# Patient Record
Sex: FEMALE | Race: WHITE | Employment: UNEMPLOYED | URBAN - METROPOLITAN AREA
[De-identification: names, ages, dates, MRNs, and addresses within clinical notes are randomized per-mention and may not be internally consistent; named-entity substitution may affect disease eponyms.]

---

## 2020-01-07 ENCOUNTER — TELEPHONE (OUTPATIENT)
Dept: OBGYN | Age: 20
End: 2020-01-07

## 2020-02-06 ENCOUNTER — INITIAL PRENATAL (OUTPATIENT)
Dept: OBGYN | Age: 20
End: 2020-02-06
Payer: MEDICAID

## 2020-02-06 ENCOUNTER — HOSPITAL ENCOUNTER (OUTPATIENT)
Age: 20
Discharge: HOME | End: 2020-02-06
Payer: MEDICAID

## 2020-02-06 VITALS — WEIGHT: 188 LBS | SYSTOLIC BLOOD PRESSURE: 126 MMHG | HEART RATE: 90 BPM | DIASTOLIC BLOOD PRESSURE: 61 MMHG

## 2020-02-06 LAB
ABO/RH: NORMAL
ANTIBODY SCREEN: NORMAL
BASOPHILS ABSOLUTE: 0.1 K/UL (ref 0–0.2)
BASOPHILS RELATIVE PERCENT: 0.7 %
BILIRUBIN URINE: NEGATIVE MG/DL
BLOOD, URINE: ABNORMAL
CLARITY: CLEAR
COLOR: YELLOW
EOSINOPHILS ABSOLUTE: 0.1 K/UL (ref 0–0.6)
EOSINOPHILS RELATIVE PERCENT: 1.4 %
GLUCOSE URINE: NEGATIVE MG/DL
HCT VFR BLD CALC: 34.5 % (ref 36–48)
HEMOGLOBIN: 12 G/DL (ref 12–16)
HEPATITIS B SURFACE ANTIGEN INTERPRETATION: ABNORMAL
HEPATITIS C ANTIBODY INTERPRETATION: NORMAL
HIV AG/AB: NORMAL
HIV ANTIGEN: NORMAL
HIV-1 ANTIBODY: NORMAL
HIV-2 AB: NORMAL
KETONES, URINE: NEGATIVE MG/DL
LEUKOCYTE ESTERASE, URINE: ABNORMAL
LYMPHOCYTES ABSOLUTE: 1.4 K/UL (ref 1–5.1)
LYMPHOCYTES RELATIVE PERCENT: 14.3 %
MCH RBC QN AUTO: 31.4 PG (ref 26–34)
MCHC RBC AUTO-ENTMCNC: 34.6 G/DL (ref 31–36)
MCV RBC AUTO: 90.7 FL (ref 80–100)
MONOCYTES ABSOLUTE: 0.5 K/UL (ref 0–1.3)
MONOCYTES RELATIVE PERCENT: 4.8 %
NEUTROPHILS ABSOLUTE: 7.5 K/UL (ref 1.7–7.7)
NEUTROPHILS RELATIVE PERCENT: 78.8 %
NITRITE, URINE: NEGATIVE
PDW BLD-RTO: 14 % (ref 12.4–15.4)
PH UA: 7 (ref 5–8)
PLATELET # BLD: 224 K/UL (ref 135–450)
PMV BLD AUTO: 8.8 FL (ref 5–10.5)
PROTEIN UA: ABNORMAL MG/DL
RBC # BLD: 3.81 M/UL (ref 4–5.2)
RUBELLA ANTIBODY IGG: 88 IU/ML
SPECIFIC GRAVITY UA: 1.02 (ref 1–1.03)
TOTAL SYPHILLIS IGG/IGM: ABNORMAL
UROBILINOGEN, URINE: 0.2 E.U./DL
WBC # BLD: 9.5 K/UL (ref 4–11)

## 2020-02-06 PROCEDURE — 99203 OFFICE O/P NEW LOW 30 MIN: CPT | Performed by: OBSTETRICS & GYNECOLOGY

## 2020-02-06 PROCEDURE — 87340 HEPATITIS B SURFACE AG IA: CPT

## 2020-02-06 PROCEDURE — 6360000002 HC RX W HCPCS: Performed by: OBSTETRICS & GYNECOLOGY

## 2020-02-06 PROCEDURE — 87491 CHLMYD TRACH DNA AMP PROBE: CPT

## 2020-02-06 PROCEDURE — 87086 URINE CULTURE/COLONY COUNT: CPT

## 2020-02-06 PROCEDURE — 86762 RUBELLA ANTIBODY: CPT

## 2020-02-06 PROCEDURE — 86803 HEPATITIS C AB TEST: CPT

## 2020-02-06 PROCEDURE — 86850 RBC ANTIBODY SCREEN: CPT

## 2020-02-06 PROCEDURE — 86901 BLOOD TYPING SEROLOGIC RH(D): CPT

## 2020-02-06 PROCEDURE — 86780 TREPONEMA PALLIDUM: CPT

## 2020-02-06 PROCEDURE — 81003 URINALYSIS AUTO W/O SCOPE: CPT

## 2020-02-06 PROCEDURE — 36415 COLL VENOUS BLD VENIPUNCTURE: CPT

## 2020-02-06 PROCEDURE — 85025 COMPLETE CBC W/AUTO DIFF WBC: CPT

## 2020-02-06 PROCEDURE — 86900 BLOOD TYPING SEROLOGIC ABO: CPT

## 2020-02-06 PROCEDURE — G0008 ADMIN INFLUENZA VIRUS VAC: HCPCS | Performed by: OBSTETRICS & GYNECOLOGY

## 2020-02-06 PROCEDURE — 86702 HIV-2 ANTIBODY: CPT

## 2020-02-06 PROCEDURE — 90686 IIV4 VACC NO PRSV 0.5 ML IM: CPT | Performed by: OBSTETRICS & GYNECOLOGY

## 2020-02-06 PROCEDURE — 87390 HIV-1 AG IA: CPT

## 2020-02-06 PROCEDURE — 86701 HIV-1ANTIBODY: CPT

## 2020-02-06 PROCEDURE — 87591 N.GONORRHOEAE DNA AMP PROB: CPT

## 2020-02-06 RX ORDER — VITAMIN A, VITAMIN C, VITAMIN D-3, VITAMIN E, VITAMIN B-1, VITAMIN B-2, NIACIN, VITAMIN B-6, CALCIUM, IRON, ZINC, COPPER 4000; 120; 400; 22; 1.84; 3; 20; 10; 1; 12; 200; 27; 25; 2 [IU]/1; MG/1; [IU]/1; MG/1; MG/1; MG/1; MG/1; MG/1; MG/1; UG/1; MG/1; MG/1; MG/1; MG/1
1 TABLET ORAL DAILY
Qty: 30 TABLET | Refills: 11 | Status: ON HOLD | OUTPATIENT
Start: 2020-02-06 | End: 2020-06-14 | Stop reason: HOSPADM

## 2020-02-06 RX ADMIN — INFLUENZA A VIRUS A/BRISBANE/02/2018 IVR-190 (H1N1) ANTIGEN (PROPIOLACTONE INACTIVATED), INFLUENZA A VIRUS A/KANSAS/14/2017 X-327 (H3N2) ANTIGEN (PROPIOLACTONE INACTIVATED), INFLUENZA B VIRUS B/MARYLAND/15/2016 ANTIGEN (PROPIOLACTONE INACTIVATED), INFLUENZA B VIRUS B/PHUKET/3073/2013 BVR-1B ANTIGEN (PROPIOLACTONE INACTIVATED) 0.5 ML: 15; 15; 15; 15 INJECTION, SUSPENSION INTRAMUSCULAR at 08:52
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
## 2020-02-06 NOTE — PROGRESS NOTES
Influenza vaccine given IM left arm per patient request after vaccine info given to patient and consent signed.

## 2020-02-06 NOTE — PROGRESS NOTES
Initial OB visit  S: no c/o  O: see PE  A/P: 21 yo  at 21w6d  1. +IUP: PE/DNAP done  2. Genetic testing- late  3.  Late prenatal care

## 2020-02-07 LAB
C TRACH DNA GENITAL QL NAA+PROBE: NEGATIVE
N. GONORRHOEAE DNA: NEGATIVE
URINE CULTURE, ROUTINE: NORMAL

## 2020-02-08 ENCOUNTER — HOSPITAL ENCOUNTER (OUTPATIENT)
Dept: ULTRASOUND IMAGING | Age: 20
Discharge: HOME | End: 2020-02-08
Payer: MEDICAID

## 2020-02-08 PROCEDURE — 76805 OB US >/= 14 WKS SNGL FETUS: CPT

## 2020-02-27 ENCOUNTER — HOSPITAL ENCOUNTER (OUTPATIENT)
Age: 20
Discharge: HOME | End: 2020-02-27
Payer: MEDICAID

## 2020-02-27 ENCOUNTER — ROUTINE PRENATAL (OUTPATIENT)
Dept: OBGYN | Age: 20
End: 2020-02-27
Payer: MEDICAID

## 2020-02-27 VITALS — SYSTOLIC BLOOD PRESSURE: 117 MMHG | WEIGHT: 189 LBS | DIASTOLIC BLOOD PRESSURE: 65 MMHG | HEART RATE: 86 BPM

## 2020-02-27 LAB
BILIRUBIN URINE: NEGATIVE MG/DL
BLOOD, URINE: ABNORMAL
CLARITY: CLEAR
COLOR: YELLOW
GLUCOSE CHALLENGE: 119 MG/DL
GLUCOSE URINE: NEGATIVE MG/DL
HCT VFR BLD CALC: 33.1 % (ref 36–48)
HEMOGLOBIN: 11.1 G/DL (ref 12–16)
KETONES, URINE: NEGATIVE MG/DL
LEUKOCYTE ESTERASE, URINE: ABNORMAL
MCH RBC QN AUTO: 30.6 PG (ref 26–34)
MCHC RBC AUTO-ENTMCNC: 33.5 G/DL (ref 31–36)
MCV RBC AUTO: 91.4 FL (ref 80–100)
NITRITE, URINE: NEGATIVE
PDW BLD-RTO: 13.6 % (ref 12.4–15.4)
PH UA: 7 (ref 5–8)
PLATELET # BLD: 221 K/UL (ref 135–450)
PMV BLD AUTO: 8.7 FL (ref 5–10.5)
PROTEIN UA: NEGATIVE MG/DL
RBC # BLD: 3.62 M/UL (ref 4–5.2)
SPECIFIC GRAVITY UA: >=1.03 (ref 1–1.03)
UROBILINOGEN, URINE: 0.2 E.U./DL
WBC # BLD: 9.5 K/UL (ref 4–11)

## 2020-02-27 PROCEDURE — 82950 GLUCOSE TEST: CPT

## 2020-02-27 PROCEDURE — 85027 COMPLETE CBC AUTOMATED: CPT

## 2020-02-27 PROCEDURE — 81003 URINALYSIS AUTO W/O SCOPE: CPT

## 2020-02-27 PROCEDURE — 36415 COLL VENOUS BLD VENIPUNCTURE: CPT

## 2020-02-27 NOTE — PROGRESS NOTES
Social Service Note:   Patient completed depression scale. Patient scored a 6 and is not showing signs of depression. Patient didn't have any other questions or needs at this time.     Sherley Zhang BSW, [place]

## 2020-03-27 ENCOUNTER — ROUTINE PRENATAL (OUTPATIENT)
Dept: OBGYN | Age: 20
End: 2020-03-27
Payer: MEDICAID

## 2020-03-27 VITALS — DIASTOLIC BLOOD PRESSURE: 62 MMHG | HEART RATE: 87 BPM | WEIGHT: 196 LBS | SYSTOLIC BLOOD PRESSURE: 111 MMHG

## 2020-03-27 PROCEDURE — 90715 TDAP VACCINE 7 YRS/> IM: CPT | Performed by: OBSTETRICS & GYNECOLOGY

## 2020-03-27 PROCEDURE — 90471 IMMUNIZATION ADMIN: CPT | Performed by: OBSTETRICS & GYNECOLOGY

## 2020-03-27 PROCEDURE — 99212 OFFICE O/P EST SF 10 MIN: CPT | Performed by: OBSTETRICS & GYNECOLOGY

## 2020-03-27 PROCEDURE — 81003 URINALYSIS AUTO W/O SCOPE: CPT

## 2020-03-27 PROCEDURE — 6360000002 HC RX W HCPCS: Performed by: OBSTETRICS & GYNECOLOGY

## 2020-03-27 RX ADMIN — TETANUS TOXOID, REDUCED DIPHTHERIA TOXOID AND ACELLULAR PERTUSSIS VACCINE, ADSORBED 0.5 ML: 5; 2.5; 8; 8; 2.5 SUSPENSION INTRAMUSCULAR at 15:02

## 2020-03-27 NOTE — PROGRESS NOTES
Pt with c/o today of HA for the last 3 days unrelieved by Tylenol; states baby is active. Patient denies vaginal bleeding and leaking of fluid/ OCCASIONAL ctx. Tdap vaccine information sheet given and explained; pt states understanding; consent form signed and vaccine given.

## 2020-03-30 LAB
BILIRUBIN URINE: NEGATIVE MG/DL
BLOOD, URINE: ABNORMAL
CLARITY: CLEAR
COLOR: YELLOW
GLUCOSE URINE: NEGATIVE MG/DL
KETONES, URINE: NEGATIVE MG/DL
LEUKOCYTE ESTERASE, URINE: ABNORMAL
NITRITE, URINE: NEGATIVE
PH UA: 7 (ref 5–8)
PROTEIN UA: 30 MG/DL
SPECIFIC GRAVITY UA: 1.02 (ref 1–1.03)
UROBILINOGEN, URINE: 1 E.U./DL

## 2020-05-01 ENCOUNTER — ROUTINE PRENATAL (OUTPATIENT)
Dept: OBGYN | Age: 20
End: 2020-05-01
Payer: MEDICAID

## 2020-05-01 VITALS — SYSTOLIC BLOOD PRESSURE: 126 MMHG | DIASTOLIC BLOOD PRESSURE: 75 MMHG | WEIGHT: 208 LBS | HEART RATE: 100 BPM

## 2020-05-01 PROCEDURE — 81003 URINALYSIS AUTO W/O SCOPE: CPT

## 2020-05-01 PROCEDURE — 99212 OFFICE O/P EST SF 10 MIN: CPT | Performed by: OBSTETRICS & GYNECOLOGY

## 2020-05-04 ENCOUNTER — HOSPITAL ENCOUNTER (OUTPATIENT)
Dept: ULTRASOUND IMAGING | Age: 20
Discharge: HOME | End: 2020-05-04
Payer: MEDICAID

## 2020-05-04 LAB
BILIRUBIN URINE: NEGATIVE MG/DL
BLOOD, URINE: ABNORMAL
CLARITY: CLEAR
COLOR: YELLOW
GLUCOSE URINE: NEGATIVE MG/DL
KETONES, URINE: NEGATIVE MG/DL
LEUKOCYTE ESTERASE, URINE: ABNORMAL
NITRITE, URINE: NEGATIVE
PH UA: 7 (ref 5–8)
PROTEIN UA: 30 MG/DL
SPECIFIC GRAVITY UA: 1.02 (ref 1–1.03)
UROBILINOGEN, URINE: 0.2 E.U./DL

## 2020-05-04 PROCEDURE — 76805 OB US >/= 14 WKS SNGL FETUS: CPT

## 2020-05-07 ENCOUNTER — HOSPITAL ENCOUNTER (OUTPATIENT)
Age: 20
Discharge: HOME | End: 2020-05-07
Attending: OBSTETRICS & GYNECOLOGY | Admitting: OBSTETRICS & GYNECOLOGY
Payer: MEDICAID

## 2020-05-07 ENCOUNTER — TELEPHONE (OUTPATIENT)
Dept: OBGYN | Age: 20
End: 2020-05-07

## 2020-05-07 VITALS
DIASTOLIC BLOOD PRESSURE: 52 MMHG | HEART RATE: 82 BPM | HEIGHT: 63 IN | RESPIRATION RATE: 20 BRPM | SYSTOLIC BLOOD PRESSURE: 107 MMHG | BODY MASS INDEX: 36.85 KG/M2 | TEMPERATURE: 99.1 F

## 2020-05-07 PROBLEM — O09.213 RISK OF PRETERM LABOR IN THIRD TRIMESTER: Status: ACTIVE | Noted: 2020-05-07

## 2020-05-07 LAB
BACTERIA: ABNORMAL /HPF
BILIRUBIN URINE: NEGATIVE
BLOOD, URINE: ABNORMAL
CLARITY: CLEAR
COLOR: YELLOW
EPITHELIAL CELLS, UA: 3 /HPF (ref 0–5)
GLUCOSE URINE: NEGATIVE MG/DL
HYALINE CASTS: 4 /LPF (ref 0–8)
KETONES, URINE: NEGATIVE MG/DL
LEUKOCYTE ESTERASE, URINE: ABNORMAL
MICROSCOPIC EXAMINATION: YES
NITRITE, URINE: NEGATIVE
PH UA: 6 (ref 5–8)
PROTEIN UA: NEGATIVE MG/DL
RBC UA: 6 /HPF (ref 0–4)
SPECIFIC GRAVITY UA: 1.02 (ref 1–1.03)
URINE TYPE: ABNORMAL
UROBILINOGEN, URINE: 0.2 E.U./DL
WBC UA: 22 /HPF (ref 0–5)

## 2020-05-07 PROCEDURE — 81001 URINALYSIS AUTO W/SCOPE: CPT

## 2020-05-07 PROCEDURE — 87086 URINE CULTURE/COLONY COUNT: CPT

## 2020-05-07 PROCEDURE — 59025 FETAL NON-STRESS TEST: CPT

## 2020-05-07 NOTE — TELEPHONE ENCOUNTER
Patient calls complaining of a large amount of clearish discharge that runs down her leg when standing. Patient states this has happened more than once today. Patient reports active fetal movement & denies any regular ctxs or vaginal bleeding. Patient advised to go to [facility] triage for rule out ROM. [facility] OB triage notified of patient future arrival today.

## 2020-05-08 LAB — URINE CULTURE, ROUTINE: NORMAL

## 2020-05-09 NOTE — PROCEDURES
FETAL SURVEILLANCE TESTING SUMMARY    INDICATIONS:  rule out uterine contractions    OBJECTIVE RESULTS:  Fetal heart variability: moderate  Fetal Heart Rate decelerations: none  Fetal Heart Rate accelerations: yes  Baseline FHR: 140 per minute  Fetal Non-stress Test: reactive    Fetal surveillance: reassuring

## 2020-05-15 ENCOUNTER — ROUTINE PRENATAL (OUTPATIENT)
Dept: OBGYN | Age: 20
End: 2020-05-15
Payer: MEDICAID

## 2020-05-15 VITALS
HEART RATE: 79 BPM | BODY MASS INDEX: 37.2 KG/M2 | SYSTOLIC BLOOD PRESSURE: 110 MMHG | DIASTOLIC BLOOD PRESSURE: 72 MMHG | WEIGHT: 210 LBS

## 2020-05-15 PROCEDURE — 81003 URINALYSIS AUTO W/O SCOPE: CPT

## 2020-05-15 PROCEDURE — 99213 OFFICE O/P EST LOW 20 MIN: CPT | Performed by: OBSTETRICS & GYNECOLOGY

## 2020-05-15 PROCEDURE — 87081 CULTURE SCREEN ONLY: CPT

## 2020-05-15 NOTE — PROGRESS NOTES
Routine visit no complaints offered states she was having a watery discharge week ago went to triage. states it has decreased since then.  Occasional contractions no bleeding. + fetal movement meeting kick counts GBS today

## 2020-05-18 LAB
BILIRUBIN URINE: NEGATIVE MG/DL
BLOOD, URINE: ABNORMAL
CLARITY: CLEAR
COLOR: YELLOW
GLUCOSE URINE: NEGATIVE MG/DL
GROUP B STREP CULTURE: NORMAL
KETONES, URINE: NEGATIVE MG/DL
LEUKOCYTE ESTERASE, URINE: ABNORMAL
NITRITE, URINE: NEGATIVE
PH UA: 6.5 (ref 5–8)
PROTEIN UA: NEGATIVE MG/DL
SPECIFIC GRAVITY UA: 1.02 (ref 1–1.03)
UROBILINOGEN, URINE: 0.2 E.U./DL

## 2020-05-22 ENCOUNTER — ROUTINE PRENATAL (OUTPATIENT)
Dept: OBGYN | Age: 20
End: 2020-05-22
Payer: MEDICAID

## 2020-05-22 VITALS
HEART RATE: 82 BPM | SYSTOLIC BLOOD PRESSURE: 125 MMHG | WEIGHT: 211 LBS | BODY MASS INDEX: 37.38 KG/M2 | DIASTOLIC BLOOD PRESSURE: 62 MMHG

## 2020-05-22 LAB
BILIRUBIN URINE: NEGATIVE MG/DL
BLOOD, URINE: NEGATIVE
CLARITY: CLEAR
COLOR: YELLOW
GLUCOSE URINE: NEGATIVE MG/DL
KETONES, URINE: NEGATIVE MG/DL
LEUKOCYTE ESTERASE, URINE: ABNORMAL
NITRITE, URINE: NEGATIVE
PH UA: 7 (ref 5–8)
PROTEIN UA: NEGATIVE MG/DL
SPECIFIC GRAVITY UA: 1.02 (ref 1–1.03)
UROBILINOGEN, URINE: 0.2 E.U./DL

## 2020-05-22 PROCEDURE — 99212 OFFICE O/P EST SF 10 MIN: CPT | Performed by: OBSTETRICS & GYNECOLOGY

## 2020-05-22 PROCEDURE — 81003 URINALYSIS AUTO W/O SCOPE: CPT

## 2020-05-29 ENCOUNTER — OFFICE VISIT (OUTPATIENT)
Dept: PRIMARY CARE CLINIC | Age: 20
End: 2020-05-29

## 2020-05-29 ENCOUNTER — ROUTINE PRENATAL (OUTPATIENT)
Dept: OBGYN | Age: 20
End: 2020-05-29
Payer: MEDICAID

## 2020-05-29 VITALS
SYSTOLIC BLOOD PRESSURE: 110 MMHG | HEART RATE: 84 BPM | DIASTOLIC BLOOD PRESSURE: 64 MMHG | BODY MASS INDEX: 38.09 KG/M2 | WEIGHT: 215 LBS

## 2020-05-29 LAB
BILIRUBIN URINE: NEGATIVE MG/DL
BLOOD, URINE: ABNORMAL
CLARITY: CLEAR
COLOR: YELLOW
GLUCOSE URINE: NEGATIVE MG/DL
KETONES, URINE: NEGATIVE MG/DL
LEUKOCYTE ESTERASE, URINE: ABNORMAL
NITRITE, URINE: NEGATIVE
PH UA: 7 (ref 5–8)
PROTEIN UA: NEGATIVE MG/DL
SARS-COV-2, NAAT: NOT DETECTED
SPECIFIC GRAVITY UA: >=1.03 (ref 1–1.03)
UROBILINOGEN, URINE: 0.2 E.U./DL

## 2020-05-29 PROCEDURE — 99212 OFFICE O/P EST SF 10 MIN: CPT | Performed by: OBSTETRICS & GYNECOLOGY

## 2020-05-29 PROCEDURE — 81003 URINALYSIS AUTO W/O SCOPE: CPT

## 2020-06-05 ENCOUNTER — ROUTINE PRENATAL (OUTPATIENT)
Dept: OBGYN | Age: 20
End: 2020-06-05
Payer: MEDICAID

## 2020-06-05 VITALS — BODY MASS INDEX: 38.37 KG/M2 | DIASTOLIC BLOOD PRESSURE: 71 MMHG | WEIGHT: 216.6 LBS | SYSTOLIC BLOOD PRESSURE: 115 MMHG

## 2020-06-05 LAB
BILIRUBIN URINE: NEGATIVE MG/DL
BLOOD, URINE: ABNORMAL
CLARITY: CLEAR
COLOR: YELLOW
GLUCOSE URINE: NEGATIVE MG/DL
KETONES, URINE: NEGATIVE MG/DL
LEUKOCYTE ESTERASE, URINE: ABNORMAL
NITRITE, URINE: NEGATIVE
PH UA: 7.5 (ref 5–8)
PROTEIN UA: NEGATIVE MG/DL
SPECIFIC GRAVITY UA: >=1.03 (ref 1–1.03)
UROBILINOGEN, URINE: 0.2 E.U./DL

## 2020-06-05 PROCEDURE — 81003 URINALYSIS AUTO W/O SCOPE: CPT

## 2020-06-05 PROCEDURE — 99212 OFFICE O/P EST SF 10 MIN: CPT | Performed by: OBSTETRICS & GYNECOLOGY

## 2020-06-12 ENCOUNTER — ROUTINE PRENATAL (OUTPATIENT)
Dept: OBGYN | Age: 20
DRG: 560 | End: 2020-06-12
Payer: MEDICAID

## 2020-06-12 VITALS
SYSTOLIC BLOOD PRESSURE: 133 MMHG | BODY MASS INDEX: 38.26 KG/M2 | HEART RATE: 88 BPM | DIASTOLIC BLOOD PRESSURE: 72 MMHG | WEIGHT: 216 LBS

## 2020-06-12 LAB
BILIRUBIN URINE: NEGATIVE MG/DL
BLOOD, URINE: ABNORMAL
CLARITY: CLEAR
COLOR: YELLOW
GLUCOSE URINE: NEGATIVE MG/DL
KETONES, URINE: NEGATIVE MG/DL
LEUKOCYTE ESTERASE, URINE: ABNORMAL
NITRITE, URINE: NEGATIVE
PH UA: 6 (ref 5–8)
PROTEIN UA: NEGATIVE MG/DL
SPECIFIC GRAVITY UA: 1.02 (ref 1–1.03)
UROBILINOGEN, URINE: 0.2 E.U./DL

## 2020-06-12 PROCEDURE — 81003 URINALYSIS AUTO W/O SCOPE: CPT

## 2020-06-12 PROCEDURE — 99212 OFFICE O/P EST SF 10 MIN: CPT | Performed by: OBSTETRICS & GYNECOLOGY

## 2020-06-13 ENCOUNTER — ANESTHESIA (OUTPATIENT)
Dept: LABOR AND DELIVERY | Age: 20
DRG: 560 | End: 2020-06-13
Payer: MEDICAID

## 2020-06-13 ENCOUNTER — ANESTHESIA EVENT (OUTPATIENT)
Dept: LABOR AND DELIVERY | Age: 20
DRG: 560 | End: 2020-06-13
Payer: MEDICAID

## 2020-06-13 ENCOUNTER — HOSPITAL ENCOUNTER (INPATIENT)
Age: 20
LOS: 1 days | Discharge: HOME | DRG: 560 | End: 2020-06-15
Attending: OBSTETRICS & GYNECOLOGY | Admitting: OBSTETRICS & GYNECOLOGY
Payer: MEDICAID

## 2020-06-13 LAB
ABO/RH: NORMAL
AMPHETAMINE SCREEN, URINE: NORMAL
ANTIBODY SCREEN: NORMAL
BARBITURATE SCREEN URINE: NORMAL
BASOPHILS ABSOLUTE: 0 K/UL (ref 0–0.2)
BASOPHILS RELATIVE PERCENT: 0.2 %
BENZODIAZEPINE SCREEN, URINE: NORMAL
BUPRENORPHINE URINE: NORMAL
CANNABINOID SCREEN URINE: NORMAL
COCAINE METABOLITE SCREEN URINE: NORMAL
EOSINOPHILS ABSOLUTE: 0.1 K/UL (ref 0–0.6)
EOSINOPHILS RELATIVE PERCENT: 0.6 %
HCT VFR BLD CALC: 33 % (ref 36–48)
HEMOGLOBIN: 11.1 G/DL (ref 12–16)
LYMPHOCYTES ABSOLUTE: 1.2 K/UL (ref 1–5.1)
LYMPHOCYTES RELATIVE PERCENT: 10.3 %
Lab: NORMAL
MCH RBC QN AUTO: 29.4 PG (ref 26–34)
MCHC RBC AUTO-ENTMCNC: 33.5 G/DL (ref 31–36)
MCV RBC AUTO: 87.7 FL (ref 80–100)
METHADONE SCREEN, URINE: NORMAL
MONOCYTES ABSOLUTE: 0.8 K/UL (ref 0–1.3)
MONOCYTES RELATIVE PERCENT: 7.2 %
NEUTROPHILS ABSOLUTE: 9.4 K/UL (ref 1.7–7.7)
NEUTROPHILS RELATIVE PERCENT: 81.7 %
OPIATE SCREEN URINE: NORMAL
OXYCODONE URINE: NORMAL
PDW BLD-RTO: 12.8 % (ref 12.4–15.4)
PH UA: 5
PHENCYCLIDINE SCREEN URINE: NORMAL
PLATELET # BLD: 270 K/UL (ref 135–450)
PMV BLD AUTO: 8.2 FL (ref 5–10.5)
PROPOXYPHENE SCREEN: NORMAL
RBC # BLD: 3.76 M/UL (ref 4–5.2)
WBC # BLD: 11.5 K/UL (ref 4–11)

## 2020-06-13 PROCEDURE — 51702 INSERT TEMP BLADDER CATH: CPT

## 2020-06-13 PROCEDURE — 86850 RBC ANTIBODY SCREEN: CPT

## 2020-06-13 PROCEDURE — 86901 BLOOD TYPING SEROLOGIC RH(D): CPT

## 2020-06-13 PROCEDURE — 3700000025 EPIDURAL BLOCK: Performed by: ANESTHESIOLOGY

## 2020-06-13 PROCEDURE — 6360000002 HC RX W HCPCS: Performed by: OBSTETRICS & GYNECOLOGY

## 2020-06-13 PROCEDURE — 2580000003 HC RX 258: Performed by: OBSTETRICS & GYNECOLOGY

## 2020-06-13 PROCEDURE — 80307 DRUG TEST PRSMV CHEM ANLYZR: CPT

## 2020-06-13 PROCEDURE — 86780 TREPONEMA PALLIDUM: CPT

## 2020-06-13 PROCEDURE — 2500000003 HC RX 250 WO HCPCS: Performed by: NURSE ANESTHETIST, CERTIFIED REGISTERED

## 2020-06-13 PROCEDURE — 6370000000 HC RX 637 (ALT 250 FOR IP): Performed by: OBSTETRICS & GYNECOLOGY

## 2020-06-13 PROCEDURE — 86900 BLOOD TYPING SEROLOGIC ABO: CPT

## 2020-06-13 PROCEDURE — 85025 COMPLETE CBC W/AUTO DIFF WBC: CPT

## 2020-06-13 RX ORDER — SODIUM CHLORIDE 0.9 % (FLUSH) 0.9 %
10 SYRINGE (ML) INJECTION PRN
Status: DISCONTINUED | OUTPATIENT
Start: 2020-06-13 | End: 2020-06-14

## 2020-06-13 RX ORDER — ONDANSETRON 2 MG/ML
4 INJECTION INTRAMUSCULAR; INTRAVENOUS EVERY 6 HOURS PRN
Status: DISCONTINUED | OUTPATIENT
Start: 2020-06-13 | End: 2020-06-14

## 2020-06-13 RX ORDER — SODIUM CHLORIDE, SODIUM LACTATE, POTASSIUM CHLORIDE, CALCIUM CHLORIDE 600; 310; 30; 20 MG/100ML; MG/100ML; MG/100ML; MG/100ML
INJECTION, SOLUTION INTRAVENOUS CONTINUOUS
Status: DISCONTINUED | OUTPATIENT
Start: 2020-06-13 | End: 2020-06-14

## 2020-06-13 RX ORDER — LIDOCAINE HYDROCHLORIDE 10 MG/ML
INJECTION, SOLUTION EPIDURAL; INFILTRATION; INTRACAUDAL; PERINEURAL PRN
Status: DISCONTINUED | OUTPATIENT
Start: 2020-06-13 | End: 2020-06-14 | Stop reason: SDUPTHER

## 2020-06-13 RX ORDER — ACETAMINOPHEN 325 MG/1
650 TABLET ORAL EVERY 4 HOURS PRN
Status: DISCONTINUED | OUTPATIENT
Start: 2020-06-13 | End: 2020-06-14

## 2020-06-13 RX ORDER — LIDOCAINE HYDROCHLORIDE AND EPINEPHRINE 20; 5 MG/ML; UG/ML
INJECTION, SOLUTION EPIDURAL; INFILTRATION; INTRACAUDAL; PERINEURAL PRN
Status: DISCONTINUED | OUTPATIENT
Start: 2020-06-13 | End: 2020-06-14 | Stop reason: SDUPTHER

## 2020-06-13 RX ADMIN — SODIUM CHLORIDE, POTASSIUM CHLORIDE, SODIUM LACTATE AND CALCIUM CHLORIDE: 600; 310; 30; 20 INJECTION, SOLUTION INTRAVENOUS at 21:10

## 2020-06-13 RX ADMIN — Medication 1 MILLI-UNITS/MIN: at 23:35

## 2020-06-13 RX ADMIN — Medication 12 ML/HR: at 21:38

## 2020-06-13 RX ADMIN — Medication 25 MCG: at 16:15

## 2020-06-13 RX ADMIN — LIDOCAINE HYDROCHLORIDE 3 ML: 10 INJECTION, SOLUTION EPIDURAL; INFILTRATION; INTRACAUDAL; PERINEURAL at 21:38

## 2020-06-13 RX ADMIN — Medication 25 MCG: at 12:15

## 2020-06-13 RX ADMIN — SODIUM CHLORIDE, POTASSIUM CHLORIDE, SODIUM LACTATE AND CALCIUM CHLORIDE: 600; 310; 30; 20 INJECTION, SOLUTION INTRAVENOUS at 12:00

## 2020-06-13 RX ADMIN — LIDOCAINE HYDROCHLORIDE AND EPINEPHRINE 3 ML: 20; 5 INJECTION, SOLUTION EPIDURAL; INFILTRATION; INTRACAUDAL; PERINEURAL at 21:38

## 2020-06-13 RX ADMIN — SODIUM CHLORIDE, POTASSIUM CHLORIDE, SODIUM LACTATE AND CALCIUM CHLORIDE: 600; 310; 30; 20 INJECTION, SOLUTION INTRAVENOUS at 17:47

## 2020-06-13 ASSESSMENT — PAIN DESCRIPTION - DESCRIPTORS
DESCRIPTORS: CRAMPING

## 2020-06-13 NOTE — PLAN OF CARE
Problem: Anxiety:  Goal: Level of anxiety will decrease  Description: Level of anxiety will decrease  Outcome: Ongoing
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
## 2020-06-14 LAB — TOTAL SYPHILLIS IGG/IGM: NORMAL

## 2020-06-14 PROCEDURE — 7200000001 HC VAGINAL DELIVERY

## 2020-06-14 PROCEDURE — 2580000003 HC RX 258: Performed by: OBSTETRICS & GYNECOLOGY

## 2020-06-14 PROCEDURE — 3E0P7VZ INTRODUCTION OF HORMONE INTO FEMALE REPRODUCTIVE, VIA NATURAL OR ARTIFICIAL OPENING: ICD-10-PCS | Performed by: OBSTETRICS & GYNECOLOGY

## 2020-06-14 PROCEDURE — 6370000000 HC RX 637 (ALT 250 FOR IP): Performed by: OBSTETRICS & GYNECOLOGY

## 2020-06-14 PROCEDURE — 10907ZC DRAINAGE OF AMNIOTIC FLUID, THERAPEUTIC FROM PRODUCTS OF CONCEPTION, VIA NATURAL OR ARTIFICIAL OPENING: ICD-10-PCS | Performed by: OBSTETRICS & GYNECOLOGY

## 2020-06-14 PROCEDURE — 1220000000 HC SEMI PRIVATE OB R&B

## 2020-06-14 PROCEDURE — 3E033VJ INTRODUCTION OF OTHER HORMONE INTO PERIPHERAL VEIN, PERCUTANEOUS APPROACH: ICD-10-PCS | Performed by: OBSTETRICS & GYNECOLOGY

## 2020-06-14 RX ORDER — SODIUM CHLORIDE 0.9 % (FLUSH) 0.9 %
10 SYRINGE (ML) INJECTION PRN
Status: DISCONTINUED | OUTPATIENT
Start: 2020-06-14 | End: 2020-06-15 | Stop reason: HOSPADM

## 2020-06-14 RX ORDER — SENNA AND DOCUSATE SODIUM 50; 8.6 MG/1; MG/1
1 TABLET, FILM COATED ORAL DAILY PRN
Status: DISCONTINUED | OUTPATIENT
Start: 2020-06-14 | End: 2020-06-15 | Stop reason: HOSPADM

## 2020-06-14 RX ORDER — MODIFIED LANOLIN
OINTMENT (GRAM) TOPICAL PRN
Status: DISCONTINUED | OUTPATIENT
Start: 2020-06-14 | End: 2020-06-15 | Stop reason: HOSPADM

## 2020-06-14 RX ORDER — SIMETHICONE 80 MG
80 TABLET,CHEWABLE ORAL EVERY 6 HOURS PRN
Status: DISCONTINUED | OUTPATIENT
Start: 2020-06-14 | End: 2020-06-15 | Stop reason: HOSPADM

## 2020-06-14 RX ORDER — ONDANSETRON 4 MG/1
4 TABLET, ORALLY DISINTEGRATING ORAL EVERY 6 HOURS PRN
Status: DISCONTINUED | OUTPATIENT
Start: 2020-06-14 | End: 2020-06-15 | Stop reason: HOSPADM

## 2020-06-14 RX ORDER — FERROUS SULFATE 325(65) MG
325 TABLET ORAL DAILY
Status: DISCONTINUED | OUTPATIENT
Start: 2020-06-14 | End: 2020-06-15 | Stop reason: HOSPADM

## 2020-06-14 RX ORDER — ONDANSETRON 2 MG/ML
4 INJECTION INTRAMUSCULAR; INTRAVENOUS EVERY 6 HOURS PRN
Status: DISCONTINUED | OUTPATIENT
Start: 2020-06-14 | End: 2020-06-15 | Stop reason: HOSPADM

## 2020-06-14 RX ORDER — SODIUM CHLORIDE 0.9 % (FLUSH) 0.9 %
10 SYRINGE (ML) INJECTION EVERY 12 HOURS SCHEDULED
Status: DISCONTINUED | OUTPATIENT
Start: 2020-06-14 | End: 2020-06-15 | Stop reason: HOSPADM

## 2020-06-14 RX ORDER — CARBOPROST TROMETHAMINE 250 UG/ML
250 INJECTION, SOLUTION INTRAMUSCULAR
Status: ACTIVE | OUTPATIENT
Start: 2020-06-14 | End: 2020-06-14

## 2020-06-14 RX ORDER — IBUPROFEN 800 MG/1
800 TABLET ORAL EVERY 8 HOURS PRN
Status: DISCONTINUED | OUTPATIENT
Start: 2020-06-14 | End: 2020-06-15 | Stop reason: HOSPADM

## 2020-06-14 RX ORDER — DOCUSATE SODIUM 100 MG/1
100 CAPSULE, LIQUID FILLED ORAL 2 TIMES DAILY
Status: DISCONTINUED | OUTPATIENT
Start: 2020-06-14 | End: 2020-06-15 | Stop reason: HOSPADM

## 2020-06-14 RX ORDER — IBUPROFEN 800 MG/1
800 TABLET ORAL EVERY 8 HOURS PRN
Qty: 30 TABLET | Refills: 0 | Status: SHIPPED | OUTPATIENT
Start: 2020-06-14

## 2020-06-14 RX ORDER — ACETAMINOPHEN 500 MG
1000 TABLET ORAL EVERY 8 HOURS SCHEDULED
Status: DISCONTINUED | OUTPATIENT
Start: 2020-06-14 | End: 2020-06-15 | Stop reason: HOSPADM

## 2020-06-14 RX ORDER — METHYLERGONOVINE MALEATE 0.2 MG/ML
200 INJECTION INTRAVENOUS
Status: ACTIVE | OUTPATIENT
Start: 2020-06-14 | End: 2020-06-14

## 2020-06-14 RX ORDER — FAMOTIDINE 20 MG/1
20 TABLET, FILM COATED ORAL 2 TIMES DAILY PRN
Status: DISCONTINUED | OUTPATIENT
Start: 2020-06-14 | End: 2020-06-15 | Stop reason: HOSPADM

## 2020-06-14 RX ORDER — ONDANSETRON 4 MG/1
4 TABLET, FILM COATED ORAL EVERY 6 HOURS PRN
Status: DISCONTINUED | OUTPATIENT
Start: 2020-06-14 | End: 2020-06-14 | Stop reason: CLARIF

## 2020-06-14 RX ORDER — ACETAMINOPHEN 500 MG
1000 TABLET ORAL EVERY 6 HOURS PRN
Qty: 30 TABLET | Refills: 0 | Status: SHIPPED | OUTPATIENT
Start: 2020-06-14

## 2020-06-14 RX ORDER — MISOPROSTOL 100 UG/1
800 TABLET ORAL PRN
Status: DISCONTINUED | OUTPATIENT
Start: 2020-06-14 | End: 2020-06-15 | Stop reason: HOSPADM

## 2020-06-14 RX ADMIN — WITCH HAZEL 40 EACH: 500 SOLUTION RECTAL; TOPICAL at 08:00

## 2020-06-14 RX ADMIN — SODIUM CHLORIDE, POTASSIUM CHLORIDE, SODIUM LACTATE AND CALCIUM CHLORIDE: 600; 310; 30; 20 INJECTION, SOLUTION INTRAVENOUS at 01:31

## 2020-06-14 RX ADMIN — IBUPROFEN 800 MG: 800 TABLET, FILM COATED ORAL at 16:23

## 2020-06-14 RX ADMIN — ACETAMINOPHEN 1000 MG: 500 TABLET, FILM COATED ORAL at 12:38

## 2020-06-14 RX ADMIN — DOCUSATE SODIUM 100 MG: 100 CAPSULE ORAL at 22:13

## 2020-06-14 RX ADMIN — Medication 10 ML: at 08:01

## 2020-06-14 RX ADMIN — ACETAMINOPHEN 1000 MG: 500 TABLET, FILM COATED ORAL at 22:12

## 2020-06-14 RX ADMIN — IBUPROFEN 800 MG: 800 TABLET, FILM COATED ORAL at 08:00

## 2020-06-14 RX ADMIN — HYDROCORTISONE: 25 CREAM TOPICAL at 08:00

## 2020-06-14 RX ADMIN — DOCUSATE SODIUM 100 MG: 100 CAPSULE ORAL at 08:00

## 2020-06-14 ASSESSMENT — PAIN SCALES - GENERAL
PAINLEVEL_OUTOF10: 1
PAINLEVEL_OUTOF10: 1
PAINLEVEL_OUTOF10: 2
PAINLEVEL_OUTOF10: 1

## 2020-06-14 ASSESSMENT — PAIN DESCRIPTION - DESCRIPTORS: DESCRIPTORS: SHARP

## 2020-06-14 NOTE — FLOWSHEET NOTE
Patient ambulated to bathroom with stand by assist without difficulty. Ambulated back to bed per self. Denied any lightheadedness or dizziness. Call light and needs remain within reach.

## 2020-06-14 NOTE — ANESTHESIA POSTPROCEDURE EVALUATION
Department of Anesthesiology  Postprocedure Note    Patient: Demetra Frank  MRN: [id number]  YOB: 2000  Date of evaluation: 6/14/2020  Time:  12:35 PM     Procedure Summary     Date:  06/13/20 Room / Location:      Anesthesia Start:  2120 Anesthesia Stop:  06/14/20 0329    Procedure:  Labor Analgesia Diagnosis:      Scheduled Providers:   Responsible Provider:  Christiano Soto MD    Anesthesia Type:  epidural ASA Status:  2          Anesthesia Type: epidural    Anders Phase I: Anders Score: 10    Anders Phase II: Anders Score: 10    Last vitals: Reviewed and per EMR flowsheets. Anesthesia Post Evaluation    Patient location during evaluation: floor  Patient participation: complete - patient participated  Level of consciousness: awake and alert  Pain score: 0  Airway patency: patent  Nausea & Vomiting: no nausea and no vomiting  Complications: no  Cardiovascular status: hemodynamically stable  Respiratory status: acceptable  Hydration status: stable  Comments: Patient s/p epidural for L&D. Pt denies residual numbness post block. Patient is ambulating and voiding without difficulty. Patient denies back pain, headache, paresthesias, n/v or pruritus. Epidural site is free of signs of infection.

## 2020-06-14 NOTE — PROGRESS NOTES
[facility] post-partum note    Post-partum Day #0    Subjective:   Pain is : Mild  Lochia: thin lochia  Breastfeeding: plans to bottle feed    Objective:  Patient Vitals for the past 8 hrs:   BP Temp Temp src Pulse Resp   06/14/20 0949 119/66 98.5 °F (36.9 °C) Oral 101 18   06/14/20 0540 (!) 115/58 98.8 °F (37.1 °C) Oral 96 18   06/14/20 0525 (!) 115/56 -- -- 100 20   06/14/20 0510 (!) 109/58 -- -- 100 20   06/14/20 0455 (!) 118/58 -- -- 106 19   06/14/20 0440 (!) 124/58 -- -- 98 18   06/14/20 0425 126/60 -- -- 96 16   06/14/20 0410 130/62 -- -- 104 16   06/14/20 0355 (!) 130/58 -- -- 106 18     Abd: soft, non tender  Uterus: firm  Ext: 1+ edema, calves non tender    Lab Results   Component Value Date    WBC 11.5 (H) 06/13/2020    HGB 11.1 (L) 06/13/2020    HCT 33.0 (L) 06/13/2020    MCV 87.7 06/13/2020     06/13/2020            Assessment/Plan:      Continue Postpartum care  Discharge today: no  Follow up: 6 weeks

## 2020-06-14 NOTE — ANESTHESIA PROCEDURE NOTES
Negative aspiration. 3cc of 1.5% Lido with epi 1:200,000 test dose given. Negative test dose. Skin:  12cm catheter taped at the skin. Secured with steri strips, tegaderm, and tape. Infusion:  .15% Ropivacaine with Fentanyl (2ug/cc)  Auto bolus 4 ml every 20 minutes. (Max. Dose- 40 ml/hr.)      Sensory Level:  R:  t10 L:  t10    VAS: start 8/10, end 0/10    Patient in supine position with left uterine displacement.  VSS:

## 2020-06-15 VITALS
OXYGEN SATURATION: 94 % | RESPIRATION RATE: 18 BRPM | HEART RATE: 74 BPM | TEMPERATURE: 97 F | DIASTOLIC BLOOD PRESSURE: 59 MMHG | SYSTOLIC BLOOD PRESSURE: 101 MMHG

## 2020-06-15 PROCEDURE — 6370000000 HC RX 637 (ALT 250 FOR IP): Performed by: OBSTETRICS & GYNECOLOGY

## 2020-06-15 RX ADMIN — DOCUSATE SODIUM 100 MG: 100 CAPSULE ORAL at 10:00

## 2020-06-15 RX ADMIN — IBUPROFEN 800 MG: 800 TABLET, FILM COATED ORAL at 12:46

## 2020-06-15 RX ADMIN — ACETAMINOPHEN 1000 MG: 500 TABLET, FILM COATED ORAL at 10:00

## 2020-06-15 RX ADMIN — IBUPROFEN 800 MG: 800 TABLET, FILM COATED ORAL at 03:47

## 2020-06-15 ASSESSMENT — PAIN SCALES - GENERAL
PAINLEVEL_OUTOF10: 3
PAINLEVEL_OUTOF10: 2
PAINLEVEL_OUTOF10: 2

## 2020-06-15 NOTE — PROGRESS NOTES
CLINICAL PHARMACY NOTE: MEDS TO [address] Select Patient?: No  Total # of Prescriptions Filled: 2   The following medications were delivered to the patient:  · Ibuprofen 800mg  · Tylenol ES 500mg  Total # of Interventions Completed: 0  Time Spent (min): 15    Additional Documentation:    Delivered to Patient    Saida Bowling CPhT

## 2020-06-15 NOTE — FLOWSHEET NOTE
Patient walking out of bathroom, introduced self to patient. Steady gait, states no needs at this time.

## 2020-06-15 NOTE — FLOWSHEET NOTE
Postpartum and infant care teaching completed and forms signed by patient. Copy witnessed by RN and given to patient. Patient verbalized understanding of all teaching points. Prescriptions given if applicable. Patient plans to follow-up with [facility] Provider as instructed in 6 weeks. Patient verbalizes understanding of discharge instructions and denies further questions. ID bands checked. Mother's ID band and one of baby's ID bands removed and taped to footprint sheet, signed by patient and witnessed by RN. Patient discharged in stable condition accompanied by family/guardian. Discharged in wheelchair, holding baby in arms.

## 2020-07-10 ENCOUNTER — TELEPHONE (OUTPATIENT)
Dept: OBGYN | Age: 20
End: 2020-07-10

## 2020-07-14 ENCOUNTER — ROUTINE PRENATAL (OUTPATIENT)
Dept: OBGYN | Age: 20
End: 2020-07-14
Payer: MEDICAID

## 2020-07-14 VITALS
DIASTOLIC BLOOD PRESSURE: 70 MMHG | HEART RATE: 55 BPM | WEIGHT: 199 LBS | SYSTOLIC BLOOD PRESSURE: 126 MMHG | BODY MASS INDEX: 35.25 KG/M2

## 2020-07-14 PROCEDURE — 99212 OFFICE O/P EST SF 10 MIN: CPT | Performed by: OBSTETRICS & GYNECOLOGY

## 2020-07-14 RX ORDER — FLUOXETINE HYDROCHLORIDE 20 MG/1
20 CAPSULE ORAL DAILY
Qty: 30 CAPSULE | Refills: 1 | Status: SHIPPED | OUTPATIENT
Start: 2020-07-14

## 2020-07-14 NOTE — PROGRESS NOTES
Social Service Note:   Patient completed depression scale. Patient scored a  15 and is  showing signs of depression but denies suicidal ideation. Pt states she has been feeling depressed since birth of child. Pt interested in mental health resources. Pt states she plans to speak with doctor today about medication options. Patient didn't have any other questions or needs at this time.     Referral: Mental Health Resource List, PP Depression Brochure     Karly Hobbs [place], [place]

## 2020-07-24 ENCOUNTER — ROUTINE PRENATAL (OUTPATIENT)
Dept: OBGYN | Age: 20
End: 2020-07-24
Payer: MEDICAID

## 2020-07-24 VITALS
HEART RATE: 88 BPM | WEIGHT: 198 LBS | SYSTOLIC BLOOD PRESSURE: 144 MMHG | DIASTOLIC BLOOD PRESSURE: 80 MMHG | BODY MASS INDEX: 35.07 KG/M2

## 2020-07-24 PROCEDURE — 99214 OFFICE O/P EST MOD 30 MIN: CPT | Performed by: OBSTETRICS & GYNECOLOGY

## 2020-07-24 PROCEDURE — 88175 CYTOPATH C/V AUTO FLUID REDO: CPT

## 2020-07-24 RX ORDER — NORGESTIMATE AND ETHINYL ESTRADIOL 0.25-0.035
1 KIT ORAL DAILY
Qty: 1 PACKET | Refills: 5 | Status: SHIPPED | OUTPATIENT
Start: 2020-07-24

## 2020-07-24 NOTE — PROGRESS NOTES
Social Service Note:   Patient completed depression scale. Patient scored a 4  and is not showing signs of depression. Patient didn't have any other questions or needs at this time.     Sumeet LLANOSW, Chaz Mohan

## 2020-07-24 NOTE — PROGRESS NOTES
[facility]  Postpartum Visit  Patient Name:  Jennifer Hale  YOB: 2000      HPI     The patient is a 23 y.o. female   OB History        2    Para   2    Term   2            AB   0    Living   2       SAB   0    TAB   0    Ectopic   0    Molar   0    Multiple   0    Live Births   2             who presents for her 6 weeks postpartum visit. Her pregnancy was complicated by PPD, is currently taking prozac& is feeling better. Problem listShe has no unusual complaints. Delivery:     Procedure:  Spontaneous vaginal delivery    Surgeon:       OB History    Para Term  AB Living   2 2 2 0 0 2   SAB TAB Ectopic Molar Multiple Live Births   0 0 0 0 0 2      # Outcome Date GA Lbr Ethan/2nd Weight Sex Delivery Anes PTL Lv   2 Term 20 40w2d 06:44 / 00:25 8 lb 0.4 oz (3.64 kg) F Vag-Spont EPI N MIREYA      Name: Matteo Steelty: 8  Apgar5: 9   1 Term 14 40w0d  6 lb 14 oz (3.118 kg) M Vag-Spont EPI N       Birth Comments: [address] [place] [address]       Anesthesia:  epidural anesthesia    Delivery complications:  None    She is currently bottle feeding    Her vaginal bleeding is none now and similar to period. She has had a period: Yes currently    She requested contraception. [facility] today, then going to [address] [place] [address] for IUD. Last Pap: never    ROS     Gastrointestinal: negative  Genitourinary:negative  Behavioral/Psych: negative      EXAM     Vital Signs:  LMP 2019      General: Mood is happy. Lungs:  Normal respiratory effort    Heart:  RRR     Breast: No masses, no erythema    Abdomen: soft, nontender, no masses    Pelvic: Vulva WNL, vagina WNL, cervix WNL, uterus WNL, perineum WNL      ASSESSMENT     1. Routine Postpartum visit  2.  Contraception counseling      PLAN     Discharge from  [facility] MD Eduard  2020

## 2021-11-02 ENCOUNTER — HOSPITAL ENCOUNTER (OUTPATIENT)
Dept: GENERAL RADIOLOGY | Age: 21
Discharge: HOME | End: 2021-11-02
Payer: COMMERCIAL

## 2021-11-02 ENCOUNTER — HOSPITAL ENCOUNTER (OUTPATIENT)
Age: 21
Discharge: HOME | End: 2021-11-02
Payer: COMMERCIAL

## 2021-11-02 DIAGNOSIS — M21.769 UNEQUAL LIMB LENGTH (ACQUIRED), UNSPECIFIED TIBIA AND FIBULA: ICD-10-CM

## 2021-11-02 PROCEDURE — 77073 BONE LENGTH STUDIES: CPT
